# Patient Record
Sex: MALE | Race: BLACK OR AFRICAN AMERICAN
[De-identification: names, ages, dates, MRNs, and addresses within clinical notes are randomized per-mention and may not be internally consistent; named-entity substitution may affect disease eponyms.]

---

## 2018-01-01 ENCOUNTER — HOSPITAL ENCOUNTER (EMERGENCY)
Dept: HOSPITAL 68 - ERH | Age: 33
LOS: 1 days | End: 2018-01-02
Payer: COMMERCIAL

## 2018-01-01 VITALS — WEIGHT: 158.73 LBS | HEIGHT: 71 IN | BODY MASS INDEX: 22.22 KG/M2

## 2018-01-01 DIAGNOSIS — F32.9: ICD-10-CM

## 2018-01-01 DIAGNOSIS — R45.851: ICD-10-CM

## 2018-01-01 DIAGNOSIS — N19: Primary | ICD-10-CM

## 2018-01-01 LAB
ABSOLUTE GRANULOCYTE CT: 5.8 /CUMM (ref 1.4–6.5)
BASOPHILS # BLD: 0 /CUMM (ref 0–0.2)
BASOPHILS NFR BLD: 0.5 % (ref 0–2)
EOSINOPHIL # BLD: 0.3 /CUMM (ref 0–0.7)
EOSINOPHIL NFR BLD: 3.4 % (ref 0–5)
ERYTHROCYTE [DISTWIDTH] IN BLOOD BY AUTOMATED COUNT: 15.5 % (ref 11.5–14.5)
GRANULOCYTES NFR BLD: 69.7 % (ref 42.2–75.2)
HCT VFR BLD CALC: 30.7 % (ref 42–52)
LYMPHOCYTES # BLD: 1.6 /CUMM (ref 1.2–3.4)
MCH RBC QN AUTO: 31.5 PG (ref 27–31)
MCHC RBC AUTO-ENTMCNC: 33.8 G/DL (ref 33–37)
MCV RBC AUTO: 93.3 FL (ref 80–94)
MONOCYTES # BLD: 0.6 /CUMM (ref 0.1–0.6)
PLATELET # BLD: 256 /CUMM (ref 130–400)
PMV BLD AUTO: 8.4 FL (ref 7.4–10.4)
RED BLOOD CELL CT: 3.29 /CUMM (ref 4.7–6.1)
WBC # BLD AUTO: 8.3 /CUMM (ref 4.8–10.8)

## 2018-01-01 PROCEDURE — G0463 HOSPITAL OUTPT CLINIC VISIT: HCPCS

## 2018-01-01 PROCEDURE — G0480 DRUG TEST DEF 1-7 CLASSES: HCPCS

## 2018-01-01 NOTE — ED PSY CRISIS COLLATERAL NOTE
Collateral Note
 
Collateral Note
Family/Inform/Marielena Contacts:
Phone contact with Brunilda Ashford (girlfriend), she reports the pt and her had an 
argument today over picking up a "bike" in [place].  "We went to [place] and 
never got the bike". "I told him I was going to leave him" and he threatened to 
overdose on his blood pressure medications (which slows his heart down).
 
Brunilda said the pt lives with his uncle and uncle's girlfriend.  Pt went home 
and did not answer his phone. The girlfriend was worried and called 911. Pt is 
on PEER
 
Brunilda said she has only been with the pt for the past "few months".  She is not
aware of a history of suicidal threats or attempts.  "He told me he took five 
blood pressure pills).
 
[facility] believes the pt needs to be admitted.

## 2018-01-01 NOTE — ED PSYCHIATRIC COMPLAINT
History of Present Illness
 
General
Chief Complaint: Psychiatric Related Complaint
Stated Complaint: BIBA +SI, ?OVERDOSE
Source: patient
Exam Limitations: no limitations
 
Vital Signs & Intake/Output
Vital Signs & Intake/Output
 Vital Signs
 
 
Date Time Temp Pulse Resp B/P B/P Pulse O2 O2 Flow FiO2
 
     Mean Ox Delivery Rate 
 
01/02 1228  94  180/90     
 
01/02 0935 97.0 113 20 170/91  100 Room Air  
 
01/02 0640 98.6 82 20 178/87  97 Room Air  
 
01/01 2230 97.3 90 18 180/106  98 Room Air  
 
01/01 2030 97.2 80 16 160/96  95 Room Air  
 
01/01 1803 97.0 64 16 140/98  94 Room Air  
 
01/01 1630  67 16 136/98  97 Room Air  
 
01/01 1453 98.0 98 18 166/97  97 Room Air  
 
 
 ED Intake and Output
 
 
 01/02 0000 01/01 1200
 
Intake Total  
 
Output Total  
 
Balance  
 
   
 
Patient 159 lb 
 
Weight  
 
Weight Reported by Patient 
 
Measurement  
 
Method  
 
 
 
Triage Nurses Notes Reviewed? yes
Onset: Just prior to arrival
Duration: worse persistent since (1 DAY)
Timing: recent history
Severity: moderate
Severity Numbers: 7
Associated Symptoms: anxiety, suicidal ideation
HPI:
Patient is a 32-year-old male with history of renal failure, on dialysis 
presenting to the emergency Department chief complaint of suicidal ideation.  
Patient reports that he's been dealing with depression for "a while" but it 
worsened over the past couple days.  He has a plan where he would take a bunch 
of pills or cutting his wrists.  He does admit to taking 5 blood pressurE 
medication pills prior to arrival to try to hurt himself.  He reports that it 
didn't work.  Denies any nausea or vomiting fevers or chills.  Positive fatigue.
 Denies lightheadedness or dizziness.  No visual changes.  Denies any homicidal 
ideation.  He does admit to drinking alcohol yesterday evening.  He also admits 
to smoking marijuana and cigarettes yesterday as well.  He is not currently on 
any antidepressant medications.  Does not currently see a therapist on a regular
basis.  Patient is due for dialysis tomorrow.
(Madai Flores)
Reconcile Medications
Cinacalcet HCl (Sensipar) 60 MG TABLET   1 TAB PO DAILY UNKNOWN  (Reported)
Metoprolol Succinate 50 MG TAB.ER.24H   1 TAB PO BID HEART  (Reported)
Sevelamer Carbonate (Renvela) 800 MG TABLET   3 TAB PO TID UNKNOWN  (Reported)
 
(Andrew HOUGH,Terrell)
 
Past History
 
Travel History
Traveled to [place] past [place] day No
 
Medical History
Any Pertinent Medical History? see below for history
 
Surgical History
Surgical History: non-contributory
 
Family History
Hx Contributory? No
(Madai Flores)
 
Review of Systems
 
Review of Systems
Constitutional:
Reports: malaise. 
Comments
Review of systems: See HPI, All other systems negative.
Constitutional, no chills fever or weight loss
HEENT: No visual changes no sore throat no congestion
Cardiovascular: No chest pain ,palpitation
Skin, no jaundice no rashes
Respiratory: No dyspnea cough sputum or hemoptysis
GI: No nausea no vomiting
: No dysuria No hematuria
Muscle skeletal: no back pain, no neck pain,
Neurologic: No numbness no confusion, no headaches
Psych: Positive stress, anxiety and depression
Heme/endocrine: No bruising no bleeding 
Immunology: No splenectomy or history of AIDS
(Madai Flores)
 
Physical Exam
 
Physical Exam
General Appearance: no apparent distress, alert, comfortable
Neurological/Psychiatric: awake, FLAT AFFECT
Comments:
Well-developed well-nourished person in no acute distress, appears fatigued.
HEENT: Atraumatic, normocephalic
Neck: Normal inspection
Back: Nontender, no CVA tenderness. 
Cardiovascular: Regular rate and rhythms no murmurs rubs or gallops, normal JVP
Respiratory: Chest nontender. No respiratory distress.breath sounds clear to 
auscultation bilaterally
Abdomen: Soft, nontender nondistended, no appreciable organomegaly. Normal bowel
sounds. No ascites
Extremity: No edemA
Neuro: Alert oriented x3
Skin: No appreciable rash on exposed skin, skin is warm and dry.
Psych: Depressed mood, flat affect., memory and judgment is normal.
SAD PERSONS
 
 
SAD PERSONS Response Value
 
Male Sex? yes 1
 
Depression/Hopelessness? yes 2
 
Excessive Ethanol/Drug Use? yes 1
 
Rational Thinking Loss? yes 2
 
Organized/Serious Attempt yes 2
 
Social Support? has support 0
 
Total   8
 
 
SAD PERSONS Done? yes
(Madai Flores)
 
Progress
Differential Diagnosis: drug intoxication, drug overdose, drug withdrawal, 
electrolyte abnormality
Plan of Care:
 Orders
 
 
Procedure Date/time Status
 
Continuous Observation Monitor 01/02 1900 Active
 
Continuous Observation Monitor 01/02 1500 Active
 
Continuous Observation Monitor 01/02 1100 Active
 
Continuous Observation Monitor 01/02 0700 Active
 
Add-on Test (ER Only) 01/01 1546 Active
 
Continuous Observation Monitor 01/01 1450 Active
 
URINE DRUG SCREEN FOR ER ONLY 01/01 1450 Complete
 
TSH REFLEX 01/01 1450 Complete
 
ETHANOL 01/01 1450 Complete
 
COMPREHENSIVE METABOLIC PANEL 01/01 1450 Complete
 
CBC WITHOUT DIFFERENTIAL 01/01 1450 Complete
 
ED CRISIS PSYCH CONSULT 01/01 1450 Active
 
 
 Laboratory Tests
 
 
 
01/01/18 1536:
Anion Gap 17  H, Estimated GFR 4  L, BUN/Creatinine Ratio 3.8  L, Glucose 74, 
Calcium 8.5, Total Bilirubin 0.7, AST 22, ALT 25, Alkaline Phosphatase 331  H, 
Total Protein 6.8, Albumin 3.9, Globulin 2.9, Albumin/Globulin Ratio 1.3, TSH &
T3 &Free T4 Intrp 0.773, CBC w Diff NO MAN DIFF REQ, RBC 3.29  L, MCV 93.3, MCH 
31.5  H, RDW 15.5  H, MPV 8.4, Gran % 69.7, Lymphocytes % 19.1  L, Monocytes % 
7.3, Eosinophils % 3.4, Basophils % 0.5, Absolute Granulocytes 5.8, Absolute 
Lymphocytes 1.6, Absolute Monocytes 0.6, Absolute Eosinophils 0.3, Absolute 
Basophils 0, PUBS MCHC 33.8, Serum Alcohol < 10.0
 
01/01/18 1528:
Urine Opiates Screen < 100.00, Methadone Screen < 40, Barbiturate Screen < 60, 
Ur Phencyclidine Scrn < 6.00, Amphetamines Screen < 100, U Benzodiazepines Scrn 
< 85, Urine Cocaine Screen < 50, Urine Cannabis Screen 6.00
 
Hand-Off
   Endorsed To:
Marlen HOUGH,Timmy IRWIN
   Pending: consult
(Madai Flores)
Comments:
1/1/2018 9:02:06 PM patient signed out to me.
 
1/1/2018 11:03:42 PM I had a lengthy discussion with Samuel regarding the 
circumstances of his arrival to the emergency department and they need to 
maintain a protected environment for him until he can be reevaluated by crisis 
in the morning.  He is willing to stay under the circumstances but feels he will
need something to help him get to sleep tonight.
 
1/2/2018 7:37:48 AM patient signed out to Dr. Morales at shift change over.
(Marlen HOUGH,Timmy IRWIN)
Comments:
Cleared by psychiatry for discharge.
(Andrew HOUGH,Terrell)
 
Departure
 
Departure
Condition: Stable
Clinical Impression
Primary Impression: Depression with suicidal ideation
Secondary Impressions: 
Renal failure
Qualifiers:  Renal failure chronicity: chronic Chronic kidney disease stage: 
unspecified stage Qualified Code: N18.9 - Chronic kidney disease, unspecified
 
(Madai Flores)
 
Departure
Time of Disposition: 1246
Disposition: HOME OR SELF CARE
Additional Instructions:
Follow up with the recommendations of the psychiatric social worker
Departure Forms:
General Discharge Information
(Andrew HOUGH,Terrell)

## 2018-01-02 VITALS — SYSTOLIC BLOOD PRESSURE: 180 MMHG | DIASTOLIC BLOOD PRESSURE: 90 MMHG

## 2018-01-02 NOTE — ED PSYCH CRISIS CONSULTATION
Crisis Consult
 
Basic Assessment
Date of Consult: 18
Responsible Person/Accompanied By: PEER
Insurance Authorization:
Insurance #1:
 
Insurance name: [employer]  C&A
Phone number: 
Policy number: [id number]
Group number: 
Authorization number: 
 
 
ED Provider:
Patient's ED Provider: Madai Flores
 
Primary Care Physician:
Patient's PCP: Patient Has No Primary Care Dr
PCP's Phone Number: 
 
Chief Complaint: Psychiatric Related Complaint
Patient's Quote: " Why am I still here."
Present Illness:
Patient is a 32-year-old male with history of renal failure, on dialysis 
presenting to the emergency Department chief complaint of suicidal ideation. Per
the PA note: Patient reports that he's been dealing with depression for "a while
" but it worsened over the past couple days.  He has a plan where he would take 
a bunch of pills or cutting his wrists.  He does admit to taking 5 blood 
pressurE medication pills prior to arrival to try to hurt himself.  He reports 
that it didn't work.  Denies any nausea or vomiting fevers or chills.  Positive 
fatigue.  Denies lightheadedness or dizziness.  No visual changes.  Denies any 
homicidal ideation.  He does admit to drinking alcohol yesterday evening.  He 
also admits to smoking marijuana and cigarettes yesterday as well.  He is not 
currently on any antidepressant medications.  Does not currently see a therapist
on a regular basis.  Patient is due for dialysis tomorrow.
 
Crisis evaluated pt today 18 as pt was too lethargic last night.  He denies 
SI/HI/AH/VH at present. Pt acknowledges the statements he made but denies intent
or taking any pills. He said he was having relationship issues with his 
girlfriend which is why he said those things. Pt presents as guarded around 
staying in the E.D as he needs to go back to work. He lives with his uncle, 
uncle's girlfriend and his 12 yo son. Pt said his uncle is out of town and 
cannot be reached.  He denies hx of psychiatric hospitalizations, trauma and has
not received any formal treatment for depresison. He denies substance abuse hx 
and denies legal hx . He has no hx of taking psychiatric medications. Pt admits 
to occasional alcohol and cannabis use " hear and there". His UTOX and BAL are 
negative. 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
 technician).   Pt does not want inpatient admission as he denies being suicidal
. 
 
Case reviewed with Dr. Gharaibeh and recommends pt to follow up with outpatient 
treatment. Dr. Gharaibeh will come and meet with the pt to clear for discharge 
as he is on a PEER. 
 
Pt is set up for  8:30 am intake eval with  OPS. 
Patient's Address:
[address] [place] [address]
Home Phone Number: [phone]
Other Phone Number: 
 
Who Do You Live With? Family
Family/Informants Interviewed: Jolana- girlfriend 
Current Medications -
Scheduled Medications
Cinacalcet HCl (Sensipar) 60 MG TABLET   1 TAB PO DAILY UNKNOWN  (Reported)
     Entered as Reported by Kadeem Rodrigues on 18 1055
Metoprolol Succinate 50 MG TAB.ER.24H   1 TAB PO BID HEART  (Reported)
     Entered as Reported by Kadeem Rodrigues on 18 1053
Sevelamer Carbonate (Renvela) 800 MG TABLET   3 TAB PO TID UNKNOWN  (Reported)
     Entered as Reported by Kdaeem Rodrigues on 18 1054
 
Laboratory Results:
 Laboratory Tests
 
18 1536:
Anion Gap 17  H, Estimated GFR 4  L, BUN/Creatinine Ratio 3.8  L, Glucose 74, 
Calcium 8.5, Total Bilirubin 0.7, AST 22, ALT 25, Alkaline Phosphatase 331  H, 
Total Protein 6.8, Albumin 3.9, Globulin 2.9, Albumin/Globulin Ratio 1.3, TSH &
T3 &Free T4 Intrp 0.773, CBC w Diff NO MAN DIFF REQ, RBC 3.29  L, MCV 93.3, MCH 
31.5  H, RDW 15.5  H, MPV 8.4, Gran % 69.7, Lymphocytes % 19.1  L, Monocytes % 
7.3, Eosinophils % 3.4, Basophils % 0.5, Absolute Granulocytes 5.8, Absolute 
Lymphocytes 1.6, Absolute Monocytes 0.6, Absolute Eosinophils 0.3, Absolute 
Basophils 0, PUBS MCHC 33.8, Serum Alcohol < 10.0
 
18 1528:
Urine Opiates Screen < 100.00, Methadone Screen < 40, Barbiturate Screen < 60, 
Ur Phencyclidine Scrn < 6.00, Amphetamines Screen < 100, U Benzodiazepines Scrn 
< 85, Urine Cocaine Screen < 50, Urine Cannabis Screen 6.00
 
 
Past History
 
Past Medical History
Neurological: NONE
EENT: NONE
Cardiovascular: AFIB, hypertension
Respiratory: NONE
Gastrointestinal: NONE
Hepatic: NONE
Renal: chronic kidney disease, HEMODIALYSIS PT
Musculoskeletal: NONE
Psychiatric: depression
Endocrine: NONE
Blood Disorders: NONE
Cancer(s): NONE
 
Past Surgical History
Surgical History: non-contributory
 
Psychosocial History
Strengths/Capabilities:
seeking outpatient tx for depression 
Physical Limitations (Interventions):
none stated 
 
Psychiatric Treatment History
Psych Treatment
   Psychiatric Treatment No
   Inpatient Treatment No
   Outpatient Treatment No
Diagnosis by History:
pt said he has never received formal tx for depression 
 
Substance Use/Abuse History
Drug Use/Abuse
   Substances Used/Abused No
 
Substance Abuse Treatment
Substance Abuse Treatment
   Past Substance Abuse [place] No
   Inpatient Treatment No
   Outpatient Treatment No
 
Current Mental Status
 
Mental Status
Orientation: Person, Place, Situation
Affect: Anxious
Speech: WNL
Neuro-vegetative: WNL
 
Appearance
Appearance- Dress/Hygiene:
Pt is dressed in blue hospital gown and hygiene is fair 
 
Behaviors
Thought Process: WNL
Thought Content: WNL
Memory: WNL
Insight: Fair
 
SI/HI Risk Assessment
Past Suicidal Ideation/Attempts No
Current Suicidal Ideation/Att No
Past Homicidal Ideation/Att: No
Current Homicidal Ideation/Attempts No
Degree of Intent: None
Gravely Disabled: Poor Impulse Control, Poor Judgment
Risk Factors: high anxiety/distress, male
Lethality Ratin (mild)
 
PTSD Checklist
PTSD Done? patient declined
 
ED Management
Sitter: Yes
Restraints: No
 
DSM5/PS Stressors/Medical Prob
Diagnosis' (DSM 5, Stressors, Medical):
F32.9 unspecified depression 
medical: dialysis for renal failure
psychosocial: interpersonal relationship issues
Current GAF: 44
 
Departure
 
Disposition
Psych Medical Clearance
   Date: 18
   Medically Cleared at: 1200
   Time Started: 1200
   Time Ended: 1230
   Psychiatrist Consulted: Dr. Gharaibeh
Date Disposition Established: 18
Time Disposition Established: 1230
Plan for Disposition -
   Modality: Outpatient
   Facility: [facility]
   Follow-up Appt Date: 18
   Follow-Up Appt Time: 0815
   Contact: [facility]
   Telephone: [phone]
Rationale for Disposition:
Pt presents to ED on PEER for SI statement due to break up with a girlfriend. Pt
denies intent and stated he did not take any pills.  His UTOX is negative and 
BAL is 0. Case consulted with Dr. Gharaibeh and recommends outpatient treatment 
for depression. Pt is agreeable to outpatient treatment for his depression. Pt 
verbalized safety plan if he feels unsafe to call [phone] and go to nearest E.D.
Referrals
Patient Has No Primary Care Dr (PCP/Family)

## 2018-01-02 NOTE — ED PSYCHIATRIST/APRN CONSULT
Psychiatrist/APRN ED Consult
Assessment and Plan:
case reviewed with crisis clinician
pt. interviewed
electronic record reviewed
 
Findings:
For details on the circumstances leading to ED presentation see Crisis 
Clinicians detailed documentation;
 
MSE:
A 32-year-old black male looking older than his 32 years.
Was calm, cooperative and pleasant.
He showed normal psychomotor activity and normal speech
He denied thinking of suicide, he acknowlledged he said what he said because he 
was angry at girlfriend who berated him and told him she is breaking up with 
him.
He did not exhibit any abnormalities in behavior or thoughts , he was coherent 
and there were no delusions
He denied hallucinations
There were no memory deficits. He denied feeling hopeless or worthless, he 
denied wishing death or thinking of suicide
He denied thoughts of violence or homicide towards girlfriend
 
Recommendations:
Patient may be discharged home at the will of the ED physician since I don't 
believ he needs inpatient psychiatric care
Referral to straightforward outpatient psychiatric care as I don't believe IOP 
is warranated at this point